# Patient Record
Sex: FEMALE | Race: WHITE | NOT HISPANIC OR LATINO
[De-identification: names, ages, dates, MRNs, and addresses within clinical notes are randomized per-mention and may not be internally consistent; named-entity substitution may affect disease eponyms.]

---

## 2022-07-30 ENCOUNTER — TELEPHONE ENCOUNTER (OUTPATIENT)
Age: 82
End: 2022-07-30

## 2022-07-31 ENCOUNTER — TELEPHONE ENCOUNTER (OUTPATIENT)
Age: 82
End: 2022-07-31

## 2023-06-28 ENCOUNTER — WEB ENCOUNTER (OUTPATIENT)
Dept: URBAN - METROPOLITAN AREA CLINIC 60 | Facility: CLINIC | Age: 83
End: 2023-06-28

## 2023-06-28 ENCOUNTER — OFFICE VISIT (OUTPATIENT)
Dept: URBAN - METROPOLITAN AREA CLINIC 60 | Facility: CLINIC | Age: 83
End: 2023-06-28
Payer: MEDICARE

## 2023-06-28 VITALS
SYSTOLIC BLOOD PRESSURE: 130 MMHG | HEIGHT: 61 IN | WEIGHT: 85.6 LBS | TEMPERATURE: 97.8 F | OXYGEN SATURATION: 95 % | BODY MASS INDEX: 16.16 KG/M2 | HEART RATE: 96 BPM | DIASTOLIC BLOOD PRESSURE: 76 MMHG

## 2023-06-28 DIAGNOSIS — K57.92 DIVERTICULITIS: ICD-10-CM

## 2023-06-28 DIAGNOSIS — K59.09 CHRONIC CONSTIPATION: ICD-10-CM

## 2023-06-28 PROBLEM — 307496006: Status: ACTIVE | Noted: 2023-06-28

## 2023-06-28 PROCEDURE — 99203 OFFICE O/P NEW LOW 30 MIN: CPT | Performed by: NURSE PRACTITIONER

## 2023-06-28 RX ORDER — ONDANSETRON HYDROCHLORIDE 4 MG/1
1 TABLET TABLET, FILM COATED ORAL
Qty: 2 | Refills: 0 | OUTPATIENT
Start: 2023-06-28

## 2023-06-28 NOTE — HPI-PREVIOUS IMAGING
05/9/2023 ct a/p shows moderate to severe acute diverticulitis involving the sigmoid colon, no abscess or extraluminal gas the marked wall thickening and edema causing essentially a functional sigmoid colonic obstruction at thie level with large amount of stool in the colon proximal to the sigmoid colon. within the right pelvis there is a large mixed density mass-like process with multiple rouded areas of low density and irregular enhancement. This has significantly increased in size since 03/04/2019. MRI pelvis recommended marked pelvic congestion syndrome  She believes she discussed pelvic mass with pcp after er but she isn't sure.

## 2023-07-05 ENCOUNTER — LAB OUTSIDE AN ENCOUNTER (OUTPATIENT)
Dept: URBAN - METROPOLITAN AREA CLINIC 60 | Facility: CLINIC | Age: 83
End: 2023-07-05

## 2023-07-14 ENCOUNTER — OUT OF OFFICE VISIT (OUTPATIENT)
Dept: URBAN - METROPOLITAN AREA SURGERY CENTER 4 | Facility: SURGERY CENTER | Age: 83
End: 2023-07-14
Payer: MEDICARE

## 2023-07-14 ENCOUNTER — CLAIMS CREATED FROM THE CLAIM WINDOW (OUTPATIENT)
Dept: URBAN - METROPOLITAN AREA CLINIC 4 | Facility: CLINIC | Age: 83
End: 2023-07-14
Payer: MEDICARE

## 2023-07-14 DIAGNOSIS — K63.89 OTHER SPECIFIED DISEASES OF INTESTINE: ICD-10-CM

## 2023-07-14 DIAGNOSIS — D12.0 POLYP OF CECUM: ICD-10-CM

## 2023-07-14 DIAGNOSIS — D12.0 BENIGN NEOPLASM OF CECUM: ICD-10-CM

## 2023-07-14 DIAGNOSIS — R93.3 ABN FINDINGS-GI TRACT: ICD-10-CM

## 2023-07-14 DIAGNOSIS — K57.30 DIVERTCULOSIS OF LG INT W/O PERFORATION OR ABSCESS W/O BLEEDING: ICD-10-CM

## 2023-07-14 DIAGNOSIS — K62.89 OTHER SPECIFIED DISEASES OF ANUS AND RECTUM: ICD-10-CM

## 2023-07-14 DIAGNOSIS — D12.0 BENIGN TUMOR OF CECUM: ICD-10-CM

## 2023-07-14 DIAGNOSIS — K64.1 BLEEDING GRADE II HEMORRHOIDS: ICD-10-CM

## 2023-07-14 PROCEDURE — 45380 COLONOSCOPY AND BIOPSY: CPT | Performed by: INTERNAL MEDICINE

## 2023-07-14 PROCEDURE — 88305 TISSUE EXAM BY PATHOLOGIST: CPT | Performed by: PATHOLOGY

## 2023-07-14 PROCEDURE — 00811 ANES LWR INTST NDSC NOS: CPT | Performed by: NURSE ANESTHETIST, CERTIFIED REGISTERED

## 2023-07-14 PROCEDURE — 45385 COLONOSCOPY W/LESION REMOVAL: CPT | Performed by: CLINIC/CENTER

## 2023-07-14 PROCEDURE — 45385 COLONOSCOPY W/LESION REMOVAL: CPT | Performed by: INTERNAL MEDICINE

## 2023-07-14 PROCEDURE — 45380 COLONOSCOPY AND BIOPSY: CPT | Performed by: CLINIC/CENTER

## 2023-07-14 RX ORDER — ONDANSETRON HYDROCHLORIDE 4 MG/1
1 TABLET TABLET, FILM COATED ORAL
Qty: 2 | Refills: 0 | Status: ACTIVE | COMMUNITY
Start: 2023-06-28

## 2023-08-03 ENCOUNTER — DASHBOARD ENCOUNTERS (OUTPATIENT)
Age: 83
End: 2023-08-03

## 2023-08-03 ENCOUNTER — OFFICE VISIT (OUTPATIENT)
Dept: URBAN - METROPOLITAN AREA CLINIC 60 | Facility: CLINIC | Age: 83
End: 2023-08-03
Payer: MEDICARE

## 2023-08-03 VITALS
DIASTOLIC BLOOD PRESSURE: 72 MMHG | HEART RATE: 89 BPM | TEMPERATURE: 98.9 F | WEIGHT: 83.8 LBS | OXYGEN SATURATION: 98 % | SYSTOLIC BLOOD PRESSURE: 134 MMHG | HEIGHT: 61 IN | BODY MASS INDEX: 15.82 KG/M2

## 2023-08-03 DIAGNOSIS — K62.6 SOLITARY RECTAL ULCER SYNDROME: ICD-10-CM

## 2023-08-03 DIAGNOSIS — K59.04 CHRONIC IDIOPATHIC CONSTIPATION: ICD-10-CM

## 2023-08-03 DIAGNOSIS — M62.89 PELVIC FLOOR DYSFUNCTION IN FEMALE: ICD-10-CM

## 2023-08-03 DIAGNOSIS — D12.6 TUBULAR ADENOMA OF COLON: ICD-10-CM

## 2023-08-03 DIAGNOSIS — K57.90 DIVERTICULOSIS: ICD-10-CM

## 2023-08-03 PROBLEM — 397881000: Status: ACTIVE | Noted: 2023-08-03

## 2023-08-03 PROBLEM — 82934008: Status: ACTIVE | Noted: 2023-08-03

## 2023-08-03 PROCEDURE — 99213 OFFICE O/P EST LOW 20 MIN: CPT | Performed by: NURSE PRACTITIONER

## 2023-08-03 NOTE — PHYSICAL EXAM HENT:
Head,  normocephalic,  atraumatic,  Face,  Face within normal limits,  Ears,  External ears within normal limits,
CHIEF COMPLAINT:Patient is a 84y old  Male who presents with a chief complaint of     HISTORY OF PRESENT ILLNESS:  This is a pleasant gentleman with history as below s/p   cystoscopy, L RPG, TURBT	  reportedly had "SVT" in OR  no saved tele strips  He denies any chest pain, sob, palpitation, dizziness or syncope.     PAST MEDICAL & SURGICAL HISTORY:  Hematuria  Bladder tumor  [place] ([place] hearing)  Colon cancer: Dx&#x27;d 2007, s/p colon resection and chemotherapy  Hypertension  Basal cell carcinoma  S/P colon resection: 2007 with chemo          MEDICATIONS:  heparin  Injectable 5000 Unit(s) SubCutaneous every 8 hours  spironolactone 25 milliGRAM(s) Oral daily    ceFAZolin   IVPB 2000 milliGRAM(s) IV Intermittent once  ceFAZolin   IVPB 2000 milliGRAM(s) IV Intermittent every 8 hours      acetaminophen   Tablet. 650 milliGRAM(s) Oral every 6 hours PRN  HYDROmorphone  Injectable 0.2 milliGRAM(s) IV Push every 10 minutes PRN  ondansetron Injectable 4 milliGRAM(s) IV Push once PRN  oxyCODONE    5 mG/acetaminophen 325 mG 1 Tablet(s) Oral every 4 hours PRN  oxyCODONE    5 mG/acetaminophen 325 mG 2 Tablet(s) Oral every 6 hours PRN    docusate sodium 100 milliGRAM(s) Oral three times a day  senna 1 Tablet(s) Oral at bedtime      lactated ringers. 1000 milliLiter(s) IV Continuous <Continuous>  lidocaine 2% Gel 1 Application(s) Topical every 4 hours PRN      FAMILY HISTORY:      Non-contributory    SOCIAL HISTORY:    No tobacco, drugs or etoh    Allergies    No Known Allergies    Intolerances    	    REVIEW OF SYSTEMS:  as above  The rest of the 14 points ROS reviewed and except above they are unremarkable.        PHYSICAL EXAM:  T(C): 36.2 (10-25-17 @ 04:00), Max: 36.6 (10-24-17 @ 12:30)  HR: 74 (10-25-17 @ 07:00) (62 - 92)  BP: 133/95 (10-25-17 @ 07:00) (113/58 - 170/81)  RR: 18 (10-25-17 @ 07:00) (12 - 18)  SpO2: 100% (10-25-17 @ 07:00) (95% - 100%)  Wt(kg): --  I&O's Summary    24 Oct 2017 07:01  -  25 Oct 2017 07:00  --------------------------------------------------------  IN: 1250 mL / OUT: 1060 mL / NET: 190 mL    25 Oct 2017 07:01  -  25 Oct 2017 07:47  --------------------------------------------------------  IN: 75 mL / OUT: 100 mL / NET: -25 mL        Appearance: Normal	  HEENT:   Normal oral mucosa, PERRL, EOMI	  Cardiovascular: Normal S1 S2,    Murmur: pos 3/6 adis at apex rusb   Neck: JVP normal  Respiratory: Lungs clear to auscultation  Gastrointestinal:  Soft, Non-tender, + BS	  Skin: normal   Neuro: No gross deficits.   Psychiatry:  Mood & affect appropriate  Ext: No edema    LABS/DATA:    TELEMETRY: 	    ECG:  	sinus, RBBB   	  CARDIAC MARKERS:  Troponin T, Serum: <0.01 ng/mL (10-25 @ 01:23)  Troponin T, Serum: <0.01 ng/mL (10-24 @ 19:14)                                  12.0   7.4   )-----------( 162      ( 25 Oct 2017 01:23 )             35.0     10-25    140  |  103  |  26<H>  ----------------------------<  126<H>  4.1   |  23  |  1.32<H>    Ca    8.5      25 Oct 2017 01:23  Phos  3.6     10-24  Mg     1.9     10-24      proBNP:   Lipid Profile:   HgA1c:   TSH:

## 2023-08-03 NOTE — HPI-TODAY'S VISIT:
Was seen at the ER at [facility] for diverticulitis, given iv abt, 2 weeks of oral abt and 2 week of liquid diet. Lost a few lbs, previously 86-87# at home unclothed, now 85.6# on our scale with clothes and shoes.  FU colonoscopy showed diverticulosis, solitary rectal ulcer syndrome, cecal irritation and a single adenoma. Irritation in the cecum was secondary to the prep. No reason to surveil a single adenoma in an 83 yr old. After more thorough questioning she admits that she spends 3-4 hours in the bathroom every day trying to evacuate her bowels. This keeps her from traveling and impedes her quality of life. She doesn't feel she fully evacuates and she also strains to go. Using miralax daily, stopped prune juice not using fiber but eats activia yogurt.

## 2023-08-03 NOTE — HPI-COLONOSCOPY FOLLOWUP
- Non-thrombosed external hemorrhoids and internal hemorrhoids that prolapse with straining, but spontaneously regress to the resting position (Grade II) found on digital rectal exam. - One 7 mm polyp in the cecum, removed with a cold snare. Resected and retrieved. - Congested mucosa in the cecum. Biopsied. - Congested mucosa in the distal rectum. Biopsied. - Diverticulosis in the entire examined colon. - Severe diverticulosis in the recto-sigmoid colon and in the sigmoid colon. There was narrowing of the colon in association with the diverticular opening. There was evidence of diverticular spasm. - Tortuous colon. - Non-bleeding external and internal hemorrhoids  (A) Cecum, Polypectomy: TUBULAR ADENOMA(S). (B) Cecum, Biopsy: PROMINENT MUCOSAL LYMPHOID AGGREGATES; OTHERWISE, NO SIGNIFICANT ABNORMALITY. (C) Rectum, Biopsy: PROLAPSE COLONIC MUCOSA WITH FOCAL EROSION/SOLITARY RECTAL ULCER SYNDROME. Negative for Dysplasia or Malignancy.

## 2023-09-01 ENCOUNTER — TELEPHONE ENCOUNTER (OUTPATIENT)
Dept: URBAN - METROPOLITAN AREA CLINIC 60 | Facility: CLINIC | Age: 83
End: 2023-09-01

## 2023-10-05 ENCOUNTER — OFFICE VISIT (OUTPATIENT)
Dept: URBAN - METROPOLITAN AREA CLINIC 60 | Facility: CLINIC | Age: 83
End: 2023-10-05